# Patient Record
Sex: FEMALE | Race: WHITE | NOT HISPANIC OR LATINO | Employment: OTHER | URBAN - METROPOLITAN AREA
[De-identification: names, ages, dates, MRNs, and addresses within clinical notes are randomized per-mention and may not be internally consistent; named-entity substitution may affect disease eponyms.]

---

## 2017-03-09 ENCOUNTER — HOSPITAL ENCOUNTER (OUTPATIENT)
Facility: HOSPITAL | Age: 82
Setting detail: HOSPITAL OUTPATIENT SURGERY
Discharge: HOME | End: 2017-03-09
Attending: INTERNAL MEDICINE | Admitting: INTERNAL MEDICINE

## 2017-03-09 ENCOUNTER — ANESTHESIA EVENT (OUTPATIENT)
Dept: GASTROENTEROLOGY | Facility: HOSPITAL | Age: 82
End: 2017-03-09

## 2017-03-09 ENCOUNTER — ANESTHESIA (OUTPATIENT)
Dept: GASTROENTEROLOGY | Facility: HOSPITAL | Age: 82
End: 2017-03-09

## 2017-03-09 VITALS
HEIGHT: 63 IN | RESPIRATION RATE: 14 BRPM | SYSTOLIC BLOOD PRESSURE: 145 MMHG | BODY MASS INDEX: 20.02 KG/M2 | HEART RATE: 91 BPM | OXYGEN SATURATION: 94 % | WEIGHT: 113 LBS | DIASTOLIC BLOOD PRESSURE: 69 MMHG | TEMPERATURE: 98.1 F

## 2017-03-09 DIAGNOSIS — R91.8 LUNG MASS: ICD-10-CM

## 2017-03-09 PROCEDURE — 88112 CYTOPATH CELL ENHANCE TECH: CPT | Performed by: INTERNAL MEDICINE

## 2017-03-09 PROCEDURE — 88305 TISSUE EXAM BY PATHOLOGIST: CPT | Performed by: INTERNAL MEDICINE

## 2017-03-09 PROCEDURE — 25010000002 PROPOFOL 10 MG/ML EMULSION: Performed by: ANESTHESIOLOGY

## 2017-03-09 PROCEDURE — 88104 CYTOPATH FL NONGYN SMEARS: CPT | Performed by: INTERNAL MEDICINE

## 2017-03-09 PROCEDURE — 25010000002 PROPOFOL 1000 MG/ML EMULSION: Performed by: ANESTHESIOLOGY

## 2017-03-09 RX ORDER — PROPOFOL 10 MG/ML
VIAL (ML) INTRAVENOUS AS NEEDED
Status: DISCONTINUED | OUTPATIENT
Start: 2017-03-09 | End: 2017-03-09 | Stop reason: SURG

## 2017-03-09 RX ORDER — SODIUM CHLORIDE 0.9 % (FLUSH) 0.9 %
1-10 SYRINGE (ML) INJECTION AS NEEDED
Status: DISCONTINUED | OUTPATIENT
Start: 2017-03-09 | End: 2017-03-09 | Stop reason: HOSPADM

## 2017-03-09 RX ORDER — ASPIRIN 81 MG/1
81 TABLET ORAL DAILY
COMMUNITY

## 2017-03-09 RX ORDER — LIDOCAINE HYDROCHLORIDE 10 MG/ML
INJECTION, SOLUTION EPIDURAL; INFILTRATION; INTRACAUDAL; PERINEURAL AS NEEDED
Status: DISCONTINUED | OUTPATIENT
Start: 2017-03-09 | End: 2017-03-09 | Stop reason: HOSPADM

## 2017-03-09 RX ORDER — LOSARTAN POTASSIUM 50 MG/1
50 TABLET ORAL DAILY
COMMUNITY

## 2017-03-09 RX ORDER — LIDOCAINE HYDROCHLORIDE 20 MG/ML
INJECTION, SOLUTION INFILTRATION; PERINEURAL AS NEEDED
Status: DISCONTINUED | OUTPATIENT
Start: 2017-03-09 | End: 2017-03-09 | Stop reason: SURG

## 2017-03-09 RX ORDER — SODIUM CHLORIDE, SODIUM LACTATE, POTASSIUM CHLORIDE, CALCIUM CHLORIDE 600; 310; 30; 20 MG/100ML; MG/100ML; MG/100ML; MG/100ML
30 INJECTION, SOLUTION INTRAVENOUS CONTINUOUS PRN
Status: DISCONTINUED | OUTPATIENT
Start: 2017-03-09 | End: 2017-03-09 | Stop reason: HOSPADM

## 2017-03-09 RX ORDER — HYDROCODONE BITARTRATE AND ACETAMINOPHEN 7.5; 325 MG/1; MG/1
1 TABLET ORAL EVERY 6 HOURS PRN
COMMUNITY

## 2017-03-09 RX ADMIN — SODIUM CHLORIDE, POTASSIUM CHLORIDE, SODIUM LACTATE AND CALCIUM CHLORIDE 30 ML/HR: 600; 310; 30; 20 INJECTION, SOLUTION INTRAVENOUS at 09:24

## 2017-03-09 RX ADMIN — PROPOFOL 140 MCG/KG/MIN: 10 INJECTION, EMULSION INTRAVENOUS at 09:31

## 2017-03-09 RX ADMIN — PROPOFOL 40 MG: 10 INJECTION, EMULSION INTRAVENOUS at 09:38

## 2017-03-09 RX ADMIN — LIDOCAINE HYDROCHLORIDE 60 MG: 20 INJECTION, SOLUTION INFILTRATION; PERINEURAL at 09:31

## 2017-03-09 RX ADMIN — PROPOFOL 100 MG: 10 INJECTION, EMULSION INTRAVENOUS at 09:31

## 2017-03-09 NOTE — ANESTHESIA POSTPROCEDURE EVALUATION
Patient: Jessica Rodgers    Procedure Summary     Date Anesthesia Start Anesthesia Stop Room / Location    03/09/17 0928 1001  [facility] /  [facility]       Procedure Diagnosis Surgeon Provider    BRONCHOSCOPY with biopsies (N/A Bronchus) No diagnosis on file. MD John Molina MD          Anesthesia Type: general  Last vitals  BP (!) 188/77 (03/09/17 0911)    Temp 36.7 °C (98.1 °F) (03/09/17 0911)    Pulse 89 (03/09/17 0911)   Resp 18 (03/09/17 0911)    SpO2 96 % (03/09/17 0911)      Post Anesthesia Care and Evaluation    Patient location during evaluation: bedside  Patient participation: complete - patient participated  Level of consciousness: awake  Pain management: adequate  Airway patency: patent  Anesthetic complications: No anesthetic complications    Cardiovascular status: acceptable  Respiratory status: acceptable  Hydration status: acceptable    Comments:

## 2017-03-09 NOTE — ADDENDUM NOTE
Addendum  created 03/09/17 1037 by Juan Navas MD    Anesthesia Review and Sign - Ready for Procedure, Sign clinical note

## 2017-03-09 NOTE — PLAN OF CARE
Problem: GI Endoscopy (Adult)  Goal: Signs and Symptoms of Listed Potential Problems Will be Absent or Manageable (GI Endoscopy)  Outcome: Ongoing (interventions implemented as appropriate)    03/09/17 0851   GI Endoscopy   Problems Assessed (GI Endoscopy) pain   Problems Present (GI Endoscopy) none

## 2017-03-09 NOTE — ANESTHESIA PREPROCEDURE EVALUATION
Anesthesia Evaluation     no history of anesthetic complications:  NPO Status: > 8 hours   Airway   Mallampati: II  no difficulty expected  Dental      Pulmonary - normal exam   (+) a smoker Current, COPD,   Cardiovascular - normal exam    (+) hypertension, valvular problems/murmurs AS,       Neuro/Psych  GI/Hepatic/Renal/Endo      Musculoskeletal     Abdominal    Substance History      OB/GYN          Other                                    Anesthesia Plan    ASA 3     general     Anesthetic plan and risks discussed with patient.

## 2017-03-09 NOTE — ANESTHESIA POSTPROCEDURE EVALUATION
Patient: Jessica Rodgers    Procedure Summary     Date Anesthesia Start Anesthesia Stop Room / Location    03/09/17 0928 1001  [facility]       Procedure Diagnosis Surgeon Provider    BRONCHOSCOPY with biopsies, brushings and washings (N/A Bronchus) No diagnosis on file. MD John Molina MD          Anesthesia Type: general  Last vitals  /69 (03/09/17 1035)    Temp      Pulse 91 (03/09/17 1035)   Resp 14 (03/09/17 1035)    SpO2 94 % (03/09/17 1035)      Post Anesthesia Care and Evaluation    Patient location during evaluation: PACU  Patient participation: complete - patient participated  Level of consciousness: awake and alert  Pain management: adequate  Airway patency: patent  Anesthetic complications: No anesthetic complications    Cardiovascular status: acceptable  Respiratory status: acceptable  Hydration status: acceptable

## 2017-03-09 NOTE — H&P
My 3/7/16 note is reviewed there are no changes exam is unchanged today patient is accompanied by 2 daughters and she okays discussing the findings with them post procedure.  Informed consent has been obtained.

## 2017-03-10 LAB
CYTO UR: NORMAL
LAB AP CASE REPORT: NORMAL
Lab: NORMAL
PATH REPORT.FINAL DX SPEC: NORMAL
PATH REPORT.GROSS SPEC: NORMAL

## 2017-03-14 ENCOUNTER — TRANSCRIBE ORDERS (OUTPATIENT)
Dept: ADMINISTRATIVE | Facility: HOSPITAL | Age: 82
End: 2017-03-14

## 2017-03-14 DIAGNOSIS — C34.92 SQUAMOUS CELL LUNG CANCER, LEFT (HCC): Primary | ICD-10-CM

## 2017-03-14 LAB
CYTO UR: NORMAL
LAB AP CASE REPORT: NORMAL
Lab: NORMAL
PATH REPORT.ADDENDUM SPEC: NORMAL
PATH REPORT.FINAL DX SPEC: NORMAL
PATH REPORT.GROSS SPEC: NORMAL

## 2017-03-20 ENCOUNTER — HOSPITAL ENCOUNTER (OUTPATIENT)
Dept: PET IMAGING | Facility: HOSPITAL | Age: 82
End: 2017-03-20
Attending: INTERNAL MEDICINE

## 2017-03-20 ENCOUNTER — HOSPITAL ENCOUNTER (OUTPATIENT)
Dept: PET IMAGING | Facility: HOSPITAL | Age: 82
Discharge: HOME | End: 2017-03-20
Attending: INTERNAL MEDICINE

## 2017-03-20 ENCOUNTER — APPOINTMENT (OUTPATIENT)
Dept: PET IMAGING | Facility: HOSPITAL | Age: 82
End: 2017-03-20
Attending: INTERNAL MEDICINE

## 2017-03-20 ENCOUNTER — HOSPITAL ENCOUNTER (OUTPATIENT)
Dept: PET IMAGING | Facility: HOSPITAL | Age: 82
Discharge: HOME | End: 2017-03-20
Attending: INTERNAL MEDICINE | Admitting: INTERNAL MEDICINE

## 2017-03-20 DIAGNOSIS — C34.92 SQUAMOUS CELL LUNG CANCER, LEFT (HCC): ICD-10-CM

## 2017-03-20 PROCEDURE — 78815 PET IMAGE W/CT SKULL-THIGH: CPT

## 2017-03-20 PROCEDURE — A9552 F18 FDG: HCPCS | Performed by: INTERNAL MEDICINE

## 2017-03-20 PROCEDURE — 0 FLUDEOXYGLUCOSE F18 SOLUTION: Performed by: INTERNAL MEDICINE

## 2017-03-20 RX ADMIN — FLUDEOXYGLUCOSE F18 1 DOSE: 300 INJECTION INTRAVENOUS at 09:30

## 2017-03-23 ENCOUNTER — OFFICE VISIT (OUTPATIENT)
Dept: OTHER | Facility: HOSPITAL | Age: 82
End: 2017-03-23
Attending: THORACIC SURGERY (CARDIOTHORACIC VASCULAR SURGERY)

## 2017-03-23 ENCOUNTER — OFFICE VISIT (OUTPATIENT)
Dept: OTHER | Facility: HOSPITAL | Age: 82
End: 2017-03-23
Attending: INTERNAL MEDICINE

## 2017-03-23 VITALS
HEIGHT: 63 IN | HEART RATE: 104 BPM | RESPIRATION RATE: 18 BRPM | OXYGEN SATURATION: 94 % | BODY MASS INDEX: 20.02 KG/M2 | DIASTOLIC BLOOD PRESSURE: 64 MMHG | TEMPERATURE: 97.3 F | WEIGHT: 113 LBS | SYSTOLIC BLOOD PRESSURE: 113 MMHG

## 2017-03-23 VITALS
RESPIRATION RATE: 18 BRPM | OXYGEN SATURATION: 94 % | HEART RATE: 104 BPM | WEIGHT: 113 LBS | DIASTOLIC BLOOD PRESSURE: 64 MMHG | BODY MASS INDEX: 20.02 KG/M2 | HEIGHT: 63 IN | SYSTOLIC BLOOD PRESSURE: 113 MMHG | TEMPERATURE: 97.3 F

## 2017-03-23 DIAGNOSIS — C34.92 SQUAMOUS CELL CARCINOMA OF LEFT LUNG (HCC): Primary | ICD-10-CM

## 2017-03-23 DIAGNOSIS — J43.2 CENTRILOBULAR EMPHYSEMA (HCC): ICD-10-CM

## 2017-03-23 DIAGNOSIS — N63.0 BREAST MASS IN FEMALE: ICD-10-CM

## 2017-03-23 PROBLEM — D69.6 THROMBOCYTOPENIA (HCC): Status: ACTIVE | Noted: 2017-03-23

## 2017-03-23 PROBLEM — M47.816 OSTEOARTHRITIS OF LUMBAR SPINE: Status: ACTIVE | Noted: 2017-03-23

## 2017-03-23 PROBLEM — I10 HYPERTENSION: Status: ACTIVE | Noted: 2017-03-23

## 2017-03-23 PROBLEM — T81.42XA DEEP POSTOPERATIVE WOUND INFECTION: Status: ACTIVE | Noted: 2017-03-23

## 2017-03-23 PROBLEM — D64.9 ANEMIA: Status: ACTIVE | Noted: 2017-03-23

## 2017-03-23 PROCEDURE — 99204 OFFICE O/P NEW MOD 45 MIN: CPT | Performed by: THORACIC SURGERY (CARDIOTHORACIC VASCULAR SURGERY)

## 2017-03-23 PROCEDURE — 99205 OFFICE O/P NEW HI 60 MIN: CPT | Performed by: INTERNAL MEDICINE

## 2017-03-23 PROCEDURE — G0463 HOSPITAL OUTPT CLINIC VISIT: HCPCS

## 2017-03-23 NOTE — PROGRESS NOTES
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 Espinosa and Dr. Aggarwal    RECORDS OBTAINED:  Records of the patients history including those obtained from the referring provider were reviewed and summarized in detail.    History of Present Illness   Mrs. Rodgers is a pleasant 81-year-old woman with a long history of tobacco abuse and currently continuing to smoke daily.  She was recently evaluated for left shoulder pain and a chest x-ray performed showed a left hilar mass.  She underwent CT exam followed by PET scan which have shown an obstructing mass in the perihilar region of the left upper lobe with extensive postobstructive atelectasis, essentially complete consolidation of the left upper lobe.  There is an obscure approximate 4 cm obstructive central mass with SUV 12.5 and central photopenia.  There is no suspicious mediastinal or right hilar activity.  There are also 2 hypermetabolic lesions in the left breast one central measuring 1.1 cm and one in the inferior medial left breast measuring 1.8 cm.  In the right breast was an ill-defined focus of activity in the lateral retroareolar region SUV 2.7.  There was no suspicious metabolic activity in the neck, abdomen or pelvis.  The patient underwent bronchoscopy with biopsy of the left upper lobe mass by Dr. Espinosa with pathology showing squamous cell carcinoma; the tissue was reflexed for PDL 1 testing which was essentially negative at 1%.    Symptomatically, the patient has had a significant clinical decline over the past several months.  She has lost 20 pounds of weight.  She is globally weak and has difficulty even ambulating about the house to go to the bathroom etc.  She has a nonproductive cough but no hemoptysis.  She has somewhat chronic shortness of breath which is unchanged.  Her performance status is  ECOG 2 at best.    Past Medical History:   Diagnosis Date   • Aortic valve stenosis    • Chronic cough    • Lung cancer         Past Surgical History:   Procedure Laterality Date   • BACK SURGERY     • BRONCHOSCOPY N/A 3/9/2017    Procedure: BRONCHOSCOPY with biopsies, brushings and washings;  Surgeon: Manish Espinosa MD;  Location: [facility] END[facility]COPY;  Service:    • HYSTERECTOMY          Current Outpatient Prescriptions on File Prior to Visit   Medication Sig Dispense Refill   • aspirin 81 MG EC tablet Take 81 mg by mouth Daily.     • HYDROcodone-acetaminophen (NORCO) 7.5-325 MG per tablet Take 1 tablet by mouth Every 6 (Six) Hours As Needed for moderate pain (4-6).     • losartan (COZAAR) 50 MG tablet Take 50 mg by mouth Daily.       No current facility-administered medications on file prior to visit.         ALLERGIES:    Allergies   Allergen Reactions   • Sulfamethoxazole-Trimethoprim Other (See Comments)     Causes low platelet count - needed transfusion   • Flagyl [Metronidazole]    • Lisinopril         Social History     Social History   • Marital status: Single     Spouse name: N/A   • Number of children: N/A   • Years of education: N/A     Social History Main Topics   • Smoking status: Current Every Day Smoker   • Smokeless tobacco: None   • Alcohol use None   • Drug use: None   • Sexual activity: Not Asked     Other Topics Concern   • None     Social History Narrative        No family history on file.     Review of Systems   Constitutional: Positive for activity change, appetite change, fatigue and unexpected weight change. Negative for fever.   Respiratory: Positive for cough and shortness of breath. Negative for choking and wheezing.    Cardiovascular: Negative for chest pain, palpitations and leg swelling.   Gastrointestinal: Negative for abdominal distention, abdominal pain, blood in stool, diarrhea, rectal pain and vomiting.   Endocrine: Negative for polydipsia and polyuria.   Musculoskeletal:  "Positive for arthralgias, back pain and gait problem. Negative for neck stiffness.   Skin: Negative for color change, rash and wound.   Neurological: Positive for weakness and headaches. Negative for seizures, speech difficulty and numbness.   Hematological: Negative for adenopathy. Does not bruise/bleed easily.   Psychiatric/Behavioral: Negative.         Objective     Vitals:    03/23/17 0840   BP: 113/64   Pulse: 104   Resp: 18   Temp: 97.3 °F (36.3 °C)   TempSrc: Oral   SpO2: 94%  Comment: room air   Weight: 113 lb (51.3 kg)   Height: 63\" (160 cm)     No flowsheet data found.    Physical Exam    Gen:  Frail-appearing, elderly lady in no acute distress, she smells strongly of smoke  HEENT: No scleral icterus, no thrush  Cardiovascular: Regular rate rhythm, normal S1-S2  Respiratory: There are crackles in the left upper lobe and diminished breath sounds throughout  Breast: In the left breast there is a once a meter nodule palpable in the inferior medial aspect of the breast.  I do not detect axillary or supraclavicular lymphadenopathy  Abdomen: Nontender no mass  Muscle skeletal: She has nicotine stained fingertips, no peripheral edema  Neurologic: No focal deficits    RECENT LABS:  Hematology No results found for: WBC, RBC, HGB, HCT, PLT     Imaging: Dr. Mccormick and myself personally reviewed the patient's CT scan of the chest and PET images.    Assessment/Plan     1.  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 Mccormick and myself reviewed the patient's CT and PET images as well as pathology result from her recent bronchoscopy.  Pathology shows a squamous cell carcinoma with PDL 1 testing of 1%.  The patient has at this time a poor performance status and poor pulmonary function to the point she would not tolerate surgical resection.  She also looks to be a poor candidate for systemic chemotherapy because of her poor performance status.  We are going to " further evaluate the breast abnormality as outlined below, but radiation therapy to the left upper lobe mass will likely be recommended for treatment of her lung cancer followed by observation.  We plan to present her case at our multidisciplinary lung cancer conference for consensus opinion.    2.  Breast lesions: The PET scan shows 2 hypermetabolic left breast lesions and a faint metabolic activity in the right breast as well.  I can palpate a 1 cm lesion in the left breast but no axillary lymphadenopathy.  She likely has primary breast cancer as opposed to metastatic lung cancer to the breast.  Breast cancer in a patient this age is most often estrogen receptor positive and usually more indolent, so the lung cancer is certainly going to be the most life-threatening malignancy.  If she has a ER positive breast cancer, hormonal therapy would likely be indicated as opposed to surgery given her high risk lung cancer.  We plan to refer the patient for bilateral mammography and ultrasound guided biopsies of any suspicious breast lesions for further treatment planning    As mentioned above, we will present the patient's case at our multidisciplinary conference once we have the breast lesions further evaluated.  More than likely radiation will be indicated for the lung cancer as she is a poor candidate for surgery or chemotherapy.  If she has an ER positive breast cancer, hormonal therapy can be discussed with the patient.

## 2017-03-23 NOTE — PROGRESS NOTES
Pt seen by Dr. Mccormick and Dr. Macdonald. She will be scheduled for a bilateral mammogram and bilateral breast biopsies and will follow-up with Dr. Macdonald. Pt given contact cards for Dr. Mccormick, Dr. Macdonald and nurse navigator.

## 2017-03-23 NOTE — PROGRESS NOTES
Subjective   Patient ID: Jessica Rodgers is a 81 y.o. female is being seen for consultation today at the request of Manish Espinosa MD    History of Present Illness  Dear Colleague,  Jessica Rodgers was seen in care center at [facility] today March 23, 2017 as part of our multidisciplinary thoracic oncology clinic.  Together with Dr. Madi Macdonald of the [facility] oncology group we have reviewed her history her x-rays and have examined her.  Thank you for asking us to participate in the care of Mrs. Rodgers.    Mrs. Rodgers is an 81-year-old  female.  She is a lifelong smoker of at least one pack of cigarettes per day for more than 50 years.  She has had a number of back surgeries.  The most recent was in September 2016.  Since that surgery she has steadily deteriorated.  She has had poor appetite and has lost weight from 136 pounds to 113 pounds.  She has no energy.  She is very sedentary and really cannot describe shortness of breath.  She was undergoing x-rays to evaluate her back when a lesion was identified in her left lung.  She was referred to Dr. Espinosa of pulmonary medicine who evaluated her with bronchoscopy, pulmonary function tests, and a PET scan.  Bronchoscopy showed a lesion obstructing the left upper lobe mass which was proven to be squamous cell carcinoma.  She is here for further evaluation.  She has a chronic dry cough.  She has no history of hemoptysis.  She has no pleuritic pain.  She has no hoarseness or change in her voice    She denies any prior history of cancer. She does have a family history of cancer.  She has had no history of angina or myocardial infarction.  She has had no history of stroke or hypertensionthe lung    The following portions of the patient's history were reviewed and updated as appropriate: allergies, current medications, past family history, past medical history, past social history, past surgical history and problem list.  Review of Systems    Constitution: Positive for chills, decreased appetite, weakness, malaise/fatigue and night sweats.        Feeling bad   HENT: Positive for headaches and sore throat.         Trouble swallowing   Eyes: Negative.    Cardiovascular: Negative.    Respiratory: Positive for cough.    Endocrine: Negative.         Excessive thirst   Hematologic/Lymphatic: Bruises/bleeds easily.   Skin: Negative.    Musculoskeletal: Positive for back pain, joint pain and stiffness.   Gastrointestinal: Negative.    Genitourinary: Negative.    Psychiatric/Behavioral: Negative.    All other systems reviewed and are negative.    There is no problem list on file for this patient.    Past Medical History:   Diagnosis Date   • Aortic valve stenosis    • Chronic cough    • Lung cancer      Past Surgical History:   Procedure Laterality Date   • BACK SURGERY     • BRONCHOSCOPY N/A 3/9/2017    Procedure: BRONCHOSCOPY with biopsies, brushings and washings;  Surgeon: Manish Espinosa MD;  Location: [facility];  Service:    • HYSTERECTOMY       History reviewed. No pertinent family history.  Social History     Social History   • Marital status: Single     Spouse name: N/A   • Number of children: N/A   • Years of education: N/A     Occupational History   • Not on file.     Social History Main Topics   • Smoking status: Current Every Day Smoker   • Smokeless tobacco: Not on file   • Alcohol use Not on file   • Drug use: Not on file   • Sexual activity: Not on file     Other Topics Concern   • Not on file     Social History Narrative       Current Outpatient Prescriptions:   •  aspirin 81 MG EC tablet, Take 81 mg by mouth Daily., Disp: , Rfl:   •  HYDROcodone-acetaminophen (NORCO) 7.5-325 MG per tablet, Take 1 tablet by mouth Every 6 (Six) Hours As Needed for moderate pain (4-6)., Disp: , Rfl:   •  losartan (COZAAR) 50 MG tablet, Take 50 mg by mouth Daily., Disp: , Rfl:   Allergies   Allergen Reactions   • Sulfamethoxazole-Trimethoprim Other (See  Comments)     Causes low platelet count - needed transfusion   • Flagyl [Metronidazole]    • Lisinopril         Objective   Vitals:    03/23/17 0814   BP: 113/64   Pulse: 104   Resp: 18   Temp: 97.3 °F (36.3 °C)   SpO2: 94%     Physical Exam   Constitutional: She is oriented to person, place, and time. Vital signs are normal. She appears well-developed.   Very frail elderly white female who appears older than her stated age.  Cannot stand up straight because of problems with her back   HENT:   Head: Normocephalic and atraumatic.   Neck: Normal range of motion. Neck supple.   Cardiovascular: Normal rate, regular rhythm, normal heart sounds and intact distal pulses.    No murmur heard.  Pulmonary/Chest: Effort normal and breath sounds normal. She has no wheezes. She has no rhonchi. She has no rales. She exhibits no tenderness.   Abdominal: Soft. There is no tenderness.   Musculoskeletal: Normal range of motion. She exhibits no edema or tenderness.   Neurological: She is alert and oriented to person, place, and time. She has normal strength.   Skin: Skin is warm and dry. No rash noted. No cyanosis or erythema.   Psychiatric: She has a normal mood and affect. Her behavior is normal.     Independent Review of Radiographic Studies:    CT PET scan performed March 20 was independently reviewed.  There is a 4 cm centrally necrotic mass in the left perihilar area.  This mass has an SUV of 12.5.  Left upper lobe is collapsed and consolidated down around the lesion.  There is no right hilar or mediastinal hypermetabolic lymphadenopathy.  Neck chest abdomen and pelvis are negative for metastatic disease. There are 2 hypermetabolic nodules in the left breast.  There is a 1.8 cm lesion in the lower medial left breast with an SUV of 4.5. There is a 1.1 cm lesion more central in feet for near the chest wall in the left breast with an SUV of 3.6    Pulmonary function tests were also reviewed.  FVC is 1.99 which is 86% of predicted.   The FEV1 is 1.47 which is 86% of predicted.  The FEV1 FVC ratio is 74.  Diffusion capacity DLCO is 8.9 which is 41% of predicted.    Assessment/Plan     Although pulmonary function tests are surprisingly good and acceptable for lobectomy I am concerned that the location of the tumor around the left upper lobe bronchus and left pulmonary artery would require a pneumonectomy for complete removal.  This lady's overall medical condition makes her an unlikely candidate for such an aggressive surgery.  I feel that the best approach would be with primary radiation therapy.      I am concerned about the 2 lesions in her breast.  It's possible but unlikely that these represent metastatic disease.  It's more likely that she has primary breast cancer.  We have scheduled her for mammogram and ultrasound directed needle biopsy of the 2 breast lesions.  Once this information is available her case will be discussed in our thoracic oncology conference.  We will keep you informed of her progress.  Thank you for allowing us to participate in the care of Mrs. Rodgers.    There are no diagnoses linked to this encounter.

## 2017-03-27 ENCOUNTER — APPOINTMENT (OUTPATIENT)
Dept: MAMMOGRAPHY | Facility: HOSPITAL | Age: 82
End: 2017-03-27
Attending: INTERNAL MEDICINE

## 2017-03-30 ENCOUNTER — HOSPITAL ENCOUNTER (OUTPATIENT)
Dept: ULTRASOUND IMAGING | Facility: HOSPITAL | Age: 82
Discharge: HOME | End: 2017-03-30
Attending: INTERNAL MEDICINE

## 2017-03-30 ENCOUNTER — HOSPITAL ENCOUNTER (OUTPATIENT)
Dept: MAMMOGRAPHY | Facility: HOSPITAL | Age: 82
Discharge: HOME | End: 2017-03-30
Attending: INTERNAL MEDICINE | Admitting: INTERNAL MEDICINE

## 2017-03-30 VITALS
OXYGEN SATURATION: 97 % | HEART RATE: 99 BPM | SYSTOLIC BLOOD PRESSURE: 118 MMHG | BODY MASS INDEX: 20.02 KG/M2 | TEMPERATURE: 97.9 F | WEIGHT: 113 LBS | DIASTOLIC BLOOD PRESSURE: 52 MMHG | RESPIRATION RATE: 20 BRPM | HEIGHT: 63 IN

## 2017-03-30 DIAGNOSIS — R92.8 ABNORMAL MAMMOGRAM: ICD-10-CM

## 2017-03-30 DIAGNOSIS — N63.0 BREAST MASS IN FEMALE: ICD-10-CM

## 2017-03-30 PROCEDURE — 88305 TISSUE EXAM BY PATHOLOGIST: CPT | Performed by: INTERNAL MEDICINE

## 2017-03-30 PROCEDURE — 88342 IMHCHEM/IMCYTCHM 1ST ANTB: CPT | Performed by: INTERNAL MEDICINE

## 2017-03-30 PROCEDURE — 76642 ULTRASOUND BREAST LIMITED: CPT

## 2017-03-30 PROCEDURE — 88341 IMHCHEM/IMCYTCHM EA ADD ANTB: CPT | Performed by: INTERNAL MEDICINE

## 2017-03-30 PROCEDURE — G0204 DX MAMMO INCL CAD BI: HCPCS

## 2017-03-30 RX ADMIN — SODIUM BICARBONATE 20 ML: 84 INJECTION, SOLUTION INTRAVENOUS at 16:10

## 2017-03-30 RX ADMIN — SODIUM BICARBONATE 20 ML: 84 INJECTION, SOLUTION INTRAVENOUS at 16:09

## 2017-04-07 LAB
CYTO UR: NORMAL
LAB AP CASE REPORT: NORMAL
LAB AP CLINICAL INFORMATION: NORMAL
LAB AP INTRADEPARTMENTAL CONSULT: NORMAL
LAB AP SPECIAL STAINS: NORMAL
Lab: NORMAL
PATH REPORT.ADDENDUM SPEC: NORMAL
PATH REPORT.FINAL DX SPEC: NORMAL
PATH REPORT.GROSS SPEC: NORMAL

## 2017-04-10 ENCOUNTER — DOCUMENTATION (OUTPATIENT)
Dept: ONCOLOGY | Facility: CLINIC | Age: 82
End: 2017-04-10

## 2017-04-10 ENCOUNTER — APPOINTMENT (OUTPATIENT)
Dept: ONCOLOGY | Facility: CLINIC | Age: 82
End: 2017-04-10

## 2017-04-10 ENCOUNTER — APPOINTMENT (OUTPATIENT)
Dept: LAB | Facility: HOSPITAL | Age: 82
End: 2017-04-10

## 2017-04-10 NOTE — PROGRESS NOTES
Pt is being referred to [facility] today by Dr. Macdonald. Pt had been scheduled for an appt with Dr. Macdonald today, but she was too weak to make it in. Per Dr. Macdonald's request, [facility] called to see if there was any assistance we could offer. Son said he had questions about what type her cancer is, and what possible treatment would be. He said his mother just did not want to be hurt by any treatment or hospitalizations any longer. She is in constant pain, can hardly walk, and is fatigues all the time. She has been in and out of the hospital with back problems for years, prior to her current problems. [facility] requested that Dr. Macdonald call Balbir Rodgers, the patient's son. After that call, they have requested [facility] care.

## 2017-04-20 ENCOUNTER — APPOINTMENT (OUTPATIENT)
Dept: LAB | Facility: HOSPITAL | Age: 82
End: 2017-04-20

## 2017-04-20 ENCOUNTER — APPOINTMENT (OUTPATIENT)
Dept: ONCOLOGY | Facility: CLINIC | Age: 82
End: 2017-04-20

## (undated) DEVICE — MSK AIRWY LARYNG LMA UNIQUE STD PK SZ4

## (undated) DEVICE — SINGLE USE BIOPSY VALVE MAJ-210: Brand: SINGLE USE BIOPSY VALVE (STERILE)

## (undated) DEVICE — SINGLE USE SUCTION VALVE MAJ-209: Brand: SINGLE USE SUCTION VALVE (STERILE)

## (undated) DEVICE — LN SMPL O2 NASL/ORL SMART/CAPNOLINE PLS A/

## (undated) DEVICE — CONMED DISPOSABLE BRONCHIAL CYTOLOGY BRUSH, STRAIGHT HANDLE, 3 MM X 120 CM: Brand: CONMED

## (undated) DEVICE — TRAP,MUCUS SPECIMEN, 80CC: Brand: MEDLINE

## (undated) DEVICE — VITAL SIGNS™ JACKSON-REES CIRCUITS: Brand: VITAL SIGNS™

## (undated) DEVICE — BITEBLOCK OMNI BLOC

## (undated) DEVICE — TUBING, SUCTION, 1/4" X 10', STRAIGHT: Brand: MEDLINE

## (undated) DEVICE — FRCP BX RADJAW4 GASTRO PED 1.8X160X2

## (undated) DEVICE — ADAPT SWVL FIBROPTIC BRONCH